# Patient Record
Sex: MALE | Race: WHITE | HISPANIC OR LATINO | URBAN - METROPOLITAN AREA
[De-identification: names, ages, dates, MRNs, and addresses within clinical notes are randomized per-mention and may not be internally consistent; named-entity substitution may affect disease eponyms.]

---

## 2022-09-22 ENCOUNTER — EMERGENCY (EMERGENCY)
Facility: HOSPITAL | Age: 22
LOS: 1 days | Discharge: HOME | End: 2022-09-22
Attending: STUDENT IN AN ORGANIZED HEALTH CARE EDUCATION/TRAINING PROGRAM
Payer: COMMERCIAL

## 2022-09-22 VITALS
SYSTOLIC BLOOD PRESSURE: 129 MMHG | RESPIRATION RATE: 18 BRPM | TEMPERATURE: 98 F | DIASTOLIC BLOOD PRESSURE: 83 MMHG | HEART RATE: 87 BPM | WEIGHT: 179.9 LBS | OXYGEN SATURATION: 99 %

## 2022-09-22 PROCEDURE — 73130 X-RAY EXAM OF HAND: CPT | Mod: 26,RT

## 2022-09-22 PROCEDURE — 29125 APPL SHORT ARM SPLINT STATIC: CPT

## 2022-09-22 PROCEDURE — 99284 EMERGENCY DEPT VISIT MOD MDM: CPT | Mod: 25

## 2022-09-22 PROCEDURE — 29125 APPL SHORT ARM SPLINT STATIC: CPT | Mod: RT

## 2022-09-22 PROCEDURE — 73130 X-RAY EXAM OF HAND: CPT

## 2022-09-22 PROCEDURE — 90471 IMMUNIZATION ADMIN: CPT

## 2022-09-22 PROCEDURE — 90715 TDAP VACCINE 7 YRS/> IM: CPT

## 2022-09-22 PROCEDURE — 99283 EMERGENCY DEPT VISIT LOW MDM: CPT | Mod: 25

## 2022-09-22 RX ORDER — IBUPROFEN 200 MG
600 TABLET ORAL ONCE
Refills: 0 | Status: COMPLETED | OUTPATIENT
Start: 2022-09-22 | End: 2022-09-22

## 2022-09-22 RX ORDER — TETANUS TOXOID, REDUCED DIPHTHERIA TOXOID AND ACELLULAR PERTUSSIS VACCINE, ADSORBED 5; 2.5; 8; 8; 2.5 [IU]/.5ML; [IU]/.5ML; UG/.5ML; UG/.5ML; UG/.5ML
0.5 SUSPENSION INTRAMUSCULAR ONCE
Refills: 0 | Status: COMPLETED | OUTPATIENT
Start: 2022-09-22 | End: 2022-09-22

## 2022-09-22 RX ADMIN — Medication 600 MILLIGRAM(S): at 21:17

## 2022-09-22 RX ADMIN — TETANUS TOXOID, REDUCED DIPHTHERIA TOXOID AND ACELLULAR PERTUSSIS VACCINE, ADSORBED 0.5 MILLILITER(S): 5; 2.5; 8; 8; 2.5 SUSPENSION INTRAMUSCULAR at 22:07

## 2022-09-22 NOTE — ED ADULT TRIAGE NOTE - CHIEF COMPLAINT QUOTE
states he fell, landing on right hand and hit forehead on tile floor while playing with younger brother, denies LOC, no vomiting. currently c/o right hand pain. no pain medication PTA.

## 2022-09-22 NOTE — ED PROVIDER NOTE - CLINICAL SUMMARY MEDICAL DECISION MAKING FREE TEXT BOX
pt with fall , head injury, Brazilian ct negative, nexus negative, tender to palp of the right hand, will obtain xray

## 2022-09-22 NOTE — ED PROVIDER NOTE - CARE PROVIDER_API CALL
Pee Kearns)  Plastic Surgery  [address] [place] [address]  Phone: [phone]  Fax: [phone]  Follow Up Time:

## 2022-09-22 NOTE — ED PROVIDER NOTE - OBJECTIVE STATEMENT
21 y/o male with no sign medical history presents to the ED c/o right hand pain after a fall today. Notes he was playing with his brother and slipped an fell hitting his head on the tile and landing on his wrist. no loc, no nausea, no vomiting. no blood thinners. Admits to pain to the right wrist. Pain with movement of the fingers. Denies numbness, tingling, coldness. last tetanus unknown.

## 2022-09-22 NOTE — ED PROVIDER NOTE - PHYSICAL EXAMINATION
HEENT: small hematoma to forehead with abrasion, no raccoon eyes, no stein sings, no hemotympanum, PERRL, EOMI, no nystagmus, no dental injuries  Neck: supple, no midline tenderness to palpation, nt to cervical spine paraspinal m,+ FROM, NEXUS negative, no abrasions, no ecchymosis  Chest: non tender, equal expansion bilaterally, no ecchymosis, no abrasions,   Lungs: CTA, good air entry bilaterally, no wheezing, no rales, no rhonchi  Abdomen: soft, non tender, no guarding, no rebound, no distention, no ecchymosis  Back: no midline tenderness to palpation   Extremities: tender to base of the 2nd metacarpal  Skin: no rash, no lacs, + abrasion on forehead  Neuro: A & O x 3, clear speech, steady gait, cerrebellar intact, no focal deficits, CN II-XII intact, neg pronator sign

## 2022-09-22 NOTE — ED ADULT NURSE NOTE - OBJECTIVE STATEMENT
s/p fall off bed while playing with little brother. hit head on floor -LOC. c/o R hand pain and swelling. no meds PTA.

## 2022-09-22 NOTE — ED PROVIDER NOTE - NS ED ATTENDING STATEMENT MOD
This was a shared visit with the RASHID. I reviewed and verified the documentation and independently performed the documented:

## 2022-09-22 NOTE — ED PROVIDER NOTE - PATIENT PORTAL LINK FT
You can access the FollowMyHealth Patient Portal offered by [facility] by registering at the following website: [email or web address]. By joining [employer]’s FollowMyHealth portal, you will also be able to view your health information using other applications (apps) compatible with our system.
